# Patient Record
Sex: FEMALE | Race: BLACK OR AFRICAN AMERICAN | NOT HISPANIC OR LATINO
[De-identification: names, ages, dates, MRNs, and addresses within clinical notes are randomized per-mention and may not be internally consistent; named-entity substitution may affect disease eponyms.]

---

## 2020-01-04 ENCOUNTER — TRANSCRIPTION ENCOUNTER (OUTPATIENT)
Age: 45
End: 2020-01-04

## 2020-10-27 ENCOUNTER — TRANSCRIPTION ENCOUNTER (OUTPATIENT)
Age: 45
End: 2020-10-27

## 2020-10-28 ENCOUNTER — OUTPATIENT (OUTPATIENT)
Dept: OUTPATIENT SERVICES | Facility: HOSPITAL | Age: 45
LOS: 1 days | End: 2020-10-28

## 2020-10-28 VITALS
OXYGEN SATURATION: 99 % | HEART RATE: 89 BPM | RESPIRATION RATE: 16 BRPM | HEIGHT: 70 IN | SYSTOLIC BLOOD PRESSURE: 110 MMHG | WEIGHT: 190.04 LBS | DIASTOLIC BLOOD PRESSURE: 78 MMHG | TEMPERATURE: 97 F

## 2020-10-28 DIAGNOSIS — L90.5 SCAR CONDITIONS AND FIBROSIS OF SKIN: ICD-10-CM

## 2020-10-28 DIAGNOSIS — Z98.890 OTHER SPECIFIED POSTPROCEDURAL STATES: Chronic | ICD-10-CM

## 2020-10-28 DIAGNOSIS — Z64.1 PROBLEMS RELATED TO MULTIPARITY: ICD-10-CM

## 2020-10-28 LAB
APPEARANCE UR: CLEAR — SIGNIFICANT CHANGE UP
APTT BLD: 25.7 SEC — LOW (ref 27–36.3)
BILIRUB UR-MCNC: NEGATIVE — SIGNIFICANT CHANGE UP
BLD GP AB SCN SERPL QL: NEGATIVE — SIGNIFICANT CHANGE UP
BLOOD UR QL VISUAL: NEGATIVE — SIGNIFICANT CHANGE UP
COLOR SPEC: SIGNIFICANT CHANGE UP
GLUCOSE UR-MCNC: NEGATIVE — SIGNIFICANT CHANGE UP
HCT VFR BLD CALC: 35.4 % — SIGNIFICANT CHANGE UP (ref 34.5–45)
HGB BLD-MCNC: 10.9 G/DL — LOW (ref 11.5–15.5)
INR BLD: 1.01 — SIGNIFICANT CHANGE UP (ref 0.88–1.16)
KETONES UR-MCNC: NEGATIVE — SIGNIFICANT CHANGE UP
LEUKOCYTE ESTERASE UR-ACNC: NEGATIVE — SIGNIFICANT CHANGE UP
MCHC RBC-ENTMCNC: 26.9 PG — LOW (ref 27–34)
MCHC RBC-ENTMCNC: 30.8 % — LOW (ref 32–36)
MCV RBC AUTO: 87.4 FL — SIGNIFICANT CHANGE UP (ref 80–100)
NITRITE UR-MCNC: NEGATIVE — SIGNIFICANT CHANGE UP
NRBC # FLD: 0 K/UL — SIGNIFICANT CHANGE UP (ref 0–0)
PH UR: 6.5 — SIGNIFICANT CHANGE UP (ref 5–8)
PLATELET # BLD AUTO: 189 K/UL — SIGNIFICANT CHANGE UP (ref 150–400)
PMV BLD: 13.7 FL — HIGH (ref 7–13)
PROT UR-MCNC: 10 — SIGNIFICANT CHANGE UP
PROTHROM AB SERPL-ACNC: 11.6 SEC — SIGNIFICANT CHANGE UP (ref 10.6–13.6)
RBC # BLD: 4.05 M/UL — SIGNIFICANT CHANGE UP (ref 3.8–5.2)
RBC # FLD: 13.9 % — SIGNIFICANT CHANGE UP (ref 10.3–14.5)
RH IG SCN BLD-IMP: POSITIVE — SIGNIFICANT CHANGE UP
SP GR SPEC: 1.01 — SIGNIFICANT CHANGE UP (ref 1–1.04)
UROBILINOGEN FLD QL: NORMAL — SIGNIFICANT CHANGE UP
WBC # BLD: 7.77 K/UL — SIGNIFICANT CHANGE UP (ref 3.8–10.5)
WBC # FLD AUTO: 7.77 K/UL — SIGNIFICANT CHANGE UP (ref 3.8–10.5)

## 2020-10-28 NOTE — OB PST NOTE - NSHPREVIEWOFSYSTEMS_GEN_ALL_CORE
General: No fever, chills, sweating, anorexia, weight loss or weight gain. No polyphagia, polyurea, polydypsia, malaise, or fatigue    Skin: No rashes, itching, or dryness. No change in size/color of moles. No tumors, brittle nails, pitted nails, or hair loss    Breast: No tenderness, lumps, or nipple discharge      Ophthalmologic: No diplopia, photophobia, lacrimation, blurred Vision , or eye discharge    ENMT Symptoms: No hearing difficulty, ear pain, tinnitus, or vertigo. No sinus symptoms, nasal congestion, nasal   discharge, or nasal obstruction    Respiratory and Thorax: No wheezing, dyspnea, cough, hemoptysis, or pleuritic chest pPain     Cardiovascular: No chest pain, palpitations, dyspnea on exertion, orthopnea, paroxysmal nocturnal dyspnea,   peripheral edema, or claudication    Gastrointestinal: No nausea, vomiting, diarrhea, constipation, change in bowel habits, flatulence, abdominal pain, or melena    Genitourinary/ Pelvis: No hematuria, renal colic, or flank pain.  No urine discoloration, incontinence, dysuria, or urinary hesitancy. Normal urinary frequency. No nocturia, abnormal vaginal bleeding, vaginal discharge, spotting, pelvic pain, or vaginal leakage    Musculoskeletal: No arthralgia, arthritis, joint swelling, muscle cramping, muscle weakness, neck pain, arm pain, or leg pain    Neurological: No transient paralysis, weakness, paresthesias, or seizures. No syncope, tremors, vertigo, loss of sensation, difficulty walking, loss of consciousness, hemiparesis, confusion, or facial palsy    Psychiatric: No suicidal ideation, depression, anxiety, insomnia, memory loss, paranoia, mood swings, agitation, hallucinations, or hyperactivity    Hematology: epistaxis 3-4 times/month since 6/2020.  Pt. showed picture of first nose bleed and it was excessive bleeding, pt. had a nose bleed yesterday, 10/27/20,  Sent PT/PTT    Lymphatic: No enlarged or tender lymph nodes. No extremity swelling    Endocrine: No heat or cold intolerance    Immunologic: No recurrent or persistent infections

## 2020-10-28 NOTE — OB PST NOTE - PROBLEM SELECTOR PLAN 1
Pt. is scheduled for a primary ...NITO tubal...10/30/20.  Pt. verbalized understanding of instructions and that Chlorhexidine is for external use.  Pt. had COVID test.

## 2020-10-28 NOTE — OB PST NOTE - FAMILY HISTORY
Mother  Still living? Yes, Estimated age: Age Unknown  Hypertension, Age at diagnosis: Age Unknown     Grandparent  Still living? No  Hypertension, Age at diagnosis: Age Unknown

## 2020-10-29 ENCOUNTER — TRANSCRIPTION ENCOUNTER (OUTPATIENT)
Age: 45
End: 2020-10-29

## 2020-10-30 ENCOUNTER — INPATIENT (INPATIENT)
Facility: HOSPITAL | Age: 45
LOS: 1 days | Discharge: HOME | End: 2020-11-01
Attending: OBSTETRICS & GYNECOLOGY | Admitting: OBSTETRICS & GYNECOLOGY
Payer: MEDICAID

## 2020-10-30 VITALS — HEART RATE: 81 BPM | SYSTOLIC BLOOD PRESSURE: 126 MMHG | DIASTOLIC BLOOD PRESSURE: 89 MMHG

## 2020-10-30 DIAGNOSIS — Z98.890 OTHER SPECIFIED POSTPROCEDURAL STATES: Chronic | ICD-10-CM

## 2020-10-30 DIAGNOSIS — Z64.1 PROBLEMS RELATED TO MULTIPARITY: ICD-10-CM

## 2020-10-30 LAB
BASOPHILS # BLD AUTO: 0.02 K/UL — SIGNIFICANT CHANGE UP (ref 0–0.2)
BASOPHILS NFR BLD AUTO: 0.2 % — SIGNIFICANT CHANGE UP (ref 0–2)
EOSINOPHIL # BLD AUTO: 0.03 K/UL — SIGNIFICANT CHANGE UP (ref 0–0.5)
EOSINOPHIL NFR BLD AUTO: 0.4 % — SIGNIFICANT CHANGE UP (ref 0–6)
HCT VFR BLD CALC: 35.3 % — SIGNIFICANT CHANGE UP (ref 34.5–45)
HGB BLD-MCNC: 10.8 G/DL — LOW (ref 11.5–15.5)
IMM GRANULOCYTES NFR BLD AUTO: 0.4 % — SIGNIFICANT CHANGE UP (ref 0–1.5)
LYMPHOCYTES # BLD AUTO: 1.85 K/UL — SIGNIFICANT CHANGE UP (ref 1–3.3)
LYMPHOCYTES # BLD AUTO: 22.5 % — SIGNIFICANT CHANGE UP (ref 13–44)
MCHC RBC-ENTMCNC: 26.5 PG — LOW (ref 27–34)
MCHC RBC-ENTMCNC: 30.6 % — LOW (ref 32–36)
MCV RBC AUTO: 86.7 FL — SIGNIFICANT CHANGE UP (ref 80–100)
MONOCYTES # BLD AUTO: 1.08 K/UL — HIGH (ref 0–0.9)
MONOCYTES NFR BLD AUTO: 13.1 % — SIGNIFICANT CHANGE UP (ref 2–14)
NEUTROPHILS # BLD AUTO: 5.23 K/UL — SIGNIFICANT CHANGE UP (ref 1.8–7.4)
NEUTROPHILS NFR BLD AUTO: 63.4 % — SIGNIFICANT CHANGE UP (ref 43–77)
NRBC # FLD: 0 K/UL — SIGNIFICANT CHANGE UP (ref 0–0)
PLATELET # BLD AUTO: 185 K/UL — SIGNIFICANT CHANGE UP (ref 150–400)
PMV BLD: 12.6 FL — SIGNIFICANT CHANGE UP (ref 7–13)
RBC # BLD: 4.07 M/UL — SIGNIFICANT CHANGE UP (ref 3.8–5.2)
RBC # FLD: 13.8 % — SIGNIFICANT CHANGE UP (ref 10.3–14.5)
RH IG SCN BLD-IMP: POSITIVE — SIGNIFICANT CHANGE UP
SARS-COV-2 IGG SERPL QL IA: NEGATIVE — SIGNIFICANT CHANGE UP
SARS-COV-2 IGM SERPL IA-ACNC: <0.1 INDEX — SIGNIFICANT CHANGE UP
T PALLIDUM AB TITR SER: NEGATIVE — SIGNIFICANT CHANGE UP
WBC # BLD: 8.24 K/UL — SIGNIFICANT CHANGE UP (ref 3.8–10.5)
WBC # FLD AUTO: 8.24 K/UL — SIGNIFICANT CHANGE UP (ref 3.8–10.5)

## 2020-10-30 PROCEDURE — 59514 CESAREAN DELIVERY ONLY: CPT | Mod: AS,U8

## 2020-10-30 PROCEDURE — 59025 FETAL NON-STRESS TEST: CPT | Mod: 26

## 2020-10-30 PROCEDURE — 88302 TISSUE EXAM BY PATHOLOGIST: CPT | Mod: 26

## 2020-10-30 RX ORDER — SODIUM CHLORIDE 9 MG/ML
500 INJECTION, SOLUTION INTRAVENOUS ONCE
Refills: 0 | Status: COMPLETED | OUTPATIENT
Start: 2020-10-30 | End: 2020-10-30

## 2020-10-30 RX ORDER — LANOLIN
1 OINTMENT (GRAM) TOPICAL EVERY 6 HOURS
Refills: 0 | Status: DISCONTINUED | OUTPATIENT
Start: 2020-10-30 | End: 2020-11-01

## 2020-10-30 RX ORDER — OXYCODONE HYDROCHLORIDE 5 MG/1
5 TABLET ORAL
Refills: 0 | Status: COMPLETED | OUTPATIENT
Start: 2020-10-30 | End: 2020-11-06

## 2020-10-30 RX ORDER — MAGNESIUM HYDROXIDE 400 MG/1
30 TABLET, CHEWABLE ORAL
Refills: 0 | Status: DISCONTINUED | OUTPATIENT
Start: 2020-10-30 | End: 2020-11-01

## 2020-10-30 RX ORDER — INFLUENZA VIRUS VACCINE 15; 15; 15; 15 UG/.5ML; UG/.5ML; UG/.5ML; UG/.5ML
0.5 SUSPENSION INTRAMUSCULAR ONCE
Refills: 0 | Status: COMPLETED | OUTPATIENT
Start: 2020-10-30 | End: 2020-11-01

## 2020-10-30 RX ORDER — SODIUM CHLORIDE 9 MG/ML
1000 INJECTION, SOLUTION INTRAVENOUS ONCE
Refills: 0 | Status: COMPLETED | OUTPATIENT
Start: 2020-10-30 | End: 2020-10-30

## 2020-10-30 RX ORDER — SODIUM CHLORIDE 9 MG/ML
1000 INJECTION, SOLUTION INTRAVENOUS
Refills: 0 | Status: DISCONTINUED | OUTPATIENT
Start: 2020-10-30 | End: 2020-11-01

## 2020-10-30 RX ORDER — OXYTOCIN 10 UNIT/ML
333.3 VIAL (ML) INJECTION
Qty: 20 | Refills: 0 | Status: DISCONTINUED | OUTPATIENT
Start: 2020-10-30 | End: 2020-10-30

## 2020-10-30 RX ORDER — HYDROMORPHONE HYDROCHLORIDE 2 MG/ML
1 INJECTION INTRAMUSCULAR; INTRAVENOUS; SUBCUTANEOUS
Refills: 0 | Status: DISCONTINUED | OUTPATIENT
Start: 2020-10-30 | End: 2020-10-30

## 2020-10-30 RX ORDER — OXYTOCIN 10 UNIT/ML
41.67 VIAL (ML) INJECTION
Qty: 20 | Refills: 0 | Status: DISCONTINUED | OUTPATIENT
Start: 2020-10-30 | End: 2020-10-31

## 2020-10-30 RX ORDER — HEPARIN SODIUM 5000 [USP'U]/ML
5000 INJECTION INTRAVENOUS; SUBCUTANEOUS EVERY 12 HOURS
Refills: 0 | Status: DISCONTINUED | OUTPATIENT
Start: 2020-10-30 | End: 2020-11-01

## 2020-10-30 RX ORDER — DIPHENHYDRAMINE HCL 50 MG
25 CAPSULE ORAL EVERY 6 HOURS
Refills: 0 | Status: DISCONTINUED | OUTPATIENT
Start: 2020-10-30 | End: 2020-11-01

## 2020-10-30 RX ORDER — OXYCODONE HYDROCHLORIDE 5 MG/1
5 TABLET ORAL ONCE
Refills: 0 | Status: DISCONTINUED | OUTPATIENT
Start: 2020-10-30 | End: 2020-11-01

## 2020-10-30 RX ORDER — IBUPROFEN 200 MG
600 TABLET ORAL EVERY 6 HOURS
Refills: 0 | Status: COMPLETED | OUTPATIENT
Start: 2020-10-30 | End: 2021-09-28

## 2020-10-30 RX ORDER — SODIUM CHLORIDE 9 MG/ML
1000 INJECTION, SOLUTION INTRAVENOUS
Refills: 0 | Status: DISCONTINUED | OUTPATIENT
Start: 2020-10-30 | End: 2020-10-30

## 2020-10-30 RX ORDER — FAMOTIDINE 10 MG/ML
20 INJECTION INTRAVENOUS ONCE
Refills: 0 | Status: COMPLETED | OUTPATIENT
Start: 2020-10-30 | End: 2020-10-30

## 2020-10-30 RX ORDER — OXYTOCIN 10 UNIT/ML
130 VIAL (ML) INJECTION
Qty: 20 | Refills: 0 | Status: DISCONTINUED | OUTPATIENT
Start: 2020-10-30 | End: 2020-10-30

## 2020-10-30 RX ORDER — METOCLOPRAMIDE HCL 10 MG
10 TABLET ORAL ONCE
Refills: 0 | Status: COMPLETED | OUTPATIENT
Start: 2020-10-30 | End: 2020-10-30

## 2020-10-30 RX ORDER — SIMETHICONE 80 MG/1
80 TABLET, CHEWABLE ORAL EVERY 4 HOURS
Refills: 0 | Status: DISCONTINUED | OUTPATIENT
Start: 2020-10-30 | End: 2020-11-01

## 2020-10-30 RX ORDER — ACETAMINOPHEN 500 MG
975 TABLET ORAL
Refills: 0 | Status: DISCONTINUED | OUTPATIENT
Start: 2020-10-30 | End: 2020-11-01

## 2020-10-30 RX ORDER — CITRIC ACID/SODIUM CITRATE 300-500 MG
30 SOLUTION, ORAL ORAL ONCE
Refills: 0 | Status: COMPLETED | OUTPATIENT
Start: 2020-10-30 | End: 2020-10-30

## 2020-10-30 RX ORDER — KETOROLAC TROMETHAMINE 30 MG/ML
30 SYRINGE (ML) INJECTION EVERY 6 HOURS
Refills: 0 | Status: DISCONTINUED | OUTPATIENT
Start: 2020-10-30 | End: 2020-10-31

## 2020-10-30 RX ORDER — TETANUS TOXOID, REDUCED DIPHTHERIA TOXOID AND ACELLULAR PERTUSSIS VACCINE, ADSORBED 5; 2.5; 8; 8; 2.5 [IU]/.5ML; [IU]/.5ML; UG/.5ML; UG/.5ML; UG/.5ML
0.5 SUSPENSION INTRAMUSCULAR ONCE
Refills: 0 | Status: COMPLETED | OUTPATIENT
Start: 2020-10-30

## 2020-10-30 RX ADMIN — HEPARIN SODIUM 5000 UNIT(S): 5000 INJECTION INTRAVENOUS; SUBCUTANEOUS at 17:48

## 2020-10-30 RX ADMIN — Medication 30 MILLILITER(S): at 07:13

## 2020-10-30 RX ADMIN — Medication 125 MILLIUNIT(S)/MIN: at 11:31

## 2020-10-30 RX ADMIN — SODIUM CHLORIDE 75 MILLILITER(S): 9 INJECTION, SOLUTION INTRAVENOUS at 11:30

## 2020-10-30 RX ADMIN — SODIUM CHLORIDE 1000 MILLILITER(S): 9 INJECTION, SOLUTION INTRAVENOUS at 13:13

## 2020-10-30 RX ADMIN — Medication 10 MILLIGRAM(S): at 07:13

## 2020-10-30 RX ADMIN — Medication 975 MILLIGRAM(S): at 17:47

## 2020-10-30 RX ADMIN — SODIUM CHLORIDE 2000 MILLILITER(S): 9 INJECTION, SOLUTION INTRAVENOUS at 07:14

## 2020-10-30 RX ADMIN — FAMOTIDINE 20 MILLIGRAM(S): 10 INJECTION INTRAVENOUS at 07:13

## 2020-10-30 RX ADMIN — SODIUM CHLORIDE 125 MILLILITER(S): 9 INJECTION, SOLUTION INTRAVENOUS at 07:14

## 2020-10-30 RX ADMIN — Medication 975 MILLIGRAM(S): at 18:20

## 2020-10-30 NOTE — OB RN DELIVERY SUMMARY - NS_SEPSISRSKCALC_OBGYN_ALL_OB_FT
EOS calculated successfully. EOS Risk Factor: 0.5/1000 live births ([facility] national incidence); GA=37w3d; Temp=98.6; ROM=0.017; GBS='Positive'; Antibiotics='No antibiotics or any antibiotics < 2 hrs prior to birth'

## 2020-10-30 NOTE — OB PROVIDER H&P - ASSESSMENT
45y , EGA@37.3 weeks, presented for scheduled primary  section with BTL due to the history of myomectomy.  Prenatal care with Dr. Kuhn  Prenatal course is significant for  AMA.  GBS status is positive.  No adverse reactions to anesthesia, no objections to blood transfusions if clinically indicated.  OB hx: , , 8 lbs, uncomplicated  Med hx: denies  Surg hx: , myomectomy, transfused 1 PRBC, 2 D&Cs; , inguinal hernia repair   GYN hx: denies  Meds: Prenatal vitamins  Allergies: citrus fruits  Social hx: Denies alcohol, tobacco, drug use    PHYSICAL EXAM  Vital Signs HR: 81 (30 Oct 2020 06:57); BP: 126/89 (30 Oct 2020 06:57);   Gen: NAD  Head: NC/AT  Cardio: S1S2+, RRR  Resp: CTABL, no wheezing  Abdomen: Soft, NT/ND, BS+  Extremities: No LE edema bilaterally    FHR: 135 HR baseline, moderate variability, accelerations present, no decelerations, Category 1.  [place]: mild occasional contractions   SVE: deferred    A:  45y , EGA@37.4 weeks, scheduled for primary  section with BTL; history of myomectomy. +GBS status    Plan: prepare for  section with BTL; typed and screen for 2 Units.

## 2020-10-30 NOTE — OB RN DELIVERY SUMMARY - NS_CORDBLDGASA_OBGYN_ALL_OB
Both arterial and venous
Hepatitis C  Resolved after Harvoni treatment  HTN (hypertension)    Rheumatoid arthritis

## 2020-10-30 NOTE — OB PROVIDER DELIVERY SUMMARY - AMNIOTIC FLUID AMOUNT, LABOR
[Normal] : normal gait, coordination grossly intact, no focal deficits and deep tendon reflexes were 2+ and symmetric [de-identified] : Unable to assess b/l retinas on fundoscopy d/t b/l cataract within normal limits

## 2020-10-30 NOTE — BRIEF OPERATIVE NOTE - OPERATION/FINDINGS
Viable male infant, apgar 8/8, weight 2850 g,  delivered @8:50am  cephalic presentation, clear copious fluid;   loose nuchal x 1, hysterotomy closed in 2 layers; Fibrillar placed over hysterotomy, rectus layer, fascia layer, otherwise grossly nl uterus, tubes & ovaries. Bilateral tubal ligation was performed using LigaSure; both tubes sent to the pathology.  QBL: 339 cc  EBL: 800 cc  UOP: 250  IVF: 2800  Dictation done by Dr. Kuhn.  Patient is stable to L&D PACU, bonding with parents.    Heidy Mccray CNM

## 2020-10-30 NOTE — OB NEONATOLOGY/PEDIATRICIAN DELIVERY SUMMARY - NSPEDSNEONOTESA_OBGYN_ALL_OB_FT
37+3 wk male born via scheduled CS to a 46 y/o  blood type O+ mother. Maternal history of myomectomy, anemia, and epistaxis. No significant prenatal history. PNL -/-/NR/I, GBS + on 10/19. AROM at 0850 with clear fluids. Baby emerged vigorous, crying, was w/d/s/s with APGARS of 8/8. Required CPAP starting at 5 minutes of life, received approximately 15 minutes of CPAP. Mom plans to initiate formula feed, consents Hep B vaccine and consents circ. EOS 0.1.

## 2020-10-30 NOTE — BRIEF OPERATIVE NOTE - NSICDXBRIEFPOSTOP_GEN_ALL_CORE_FT
POST-OP DIAGNOSIS:  Single liveborn infant, delivered by  30-Oct-2020 10:17:48  Heidy Mccray  Delivery of pregnancy by  section 30-Oct-2020 10:17:27  Heidy Mccray

## 2020-10-30 NOTE — OB PROVIDER DELIVERY SUMMARY - NSPROVIDERDELIVERYNOTE_OBGYN_ALL_OB_FT
Viable male infant, apgar 8/8, weight 2850 g,  delivered @8:50am  cephalic presentation, clear copious fluid; clear fluid noted  loose nuchal x 1, hysterotomy closed in 2 layers; Fibrillar placed over hysterotomy, rectus layer, fascia layer, otherwise grossly nl uterus, tubes & ovaries. Bilateral tubal ligation was performed using LigaSure; both tubes sent to the pathology.  QBL: 339 cc  EBL: 800 cc  250  IVF: 2800  Dictation done by Dr. Kuhn.  Patient is stable to L&D PACU, bonding wi

## 2020-10-30 NOTE — BRIEF OPERATIVE NOTE - NSICDXBRIEFPREOP_GEN_ALL_CORE_FT
PRE-OP DIAGNOSIS:  History of myomectomy 30-Oct-2020 10:16:44  Heidy Mccray  H/O myomectomy 30-Oct-2020 10:16:27  Heidy Mccray

## 2020-10-30 NOTE — OB RN DELIVERY SUMMARY - BABY A: APGAR 5 MIN SCORE, DELIVERY
Pt states no changes to meds or diet.  No bleeding issues. Continue current coumadin regimen.  Pt is on a twice weekly INR per Dr. Welch request; f/u appt made for this Friday.  Pt verbalizes. Patient instructed regarding medication; results given and questions answered. Nutritional counseling given.  Dietary factors affecting therapy addressed.  Patient instructed to monitor for excessive bruising or bleeding.        This document has been electronically signed by JR Konx on September 26, 2017 4:40 PM       8

## 2020-10-31 LAB
BASOPHILS # BLD AUTO: 0.02 K/UL — SIGNIFICANT CHANGE UP (ref 0–0.2)
BASOPHILS NFR BLD AUTO: 0.2 % — SIGNIFICANT CHANGE UP (ref 0–2)
EOSINOPHIL # BLD AUTO: 0.15 K/UL — SIGNIFICANT CHANGE UP (ref 0–0.5)
EOSINOPHIL NFR BLD AUTO: 1.4 % — SIGNIFICANT CHANGE UP (ref 0–6)
HCT VFR BLD CALC: 29.9 % — LOW (ref 34.5–45)
HGB BLD-MCNC: 9.2 G/DL — LOW (ref 11.5–15.5)
IMM GRANULOCYTES NFR BLD AUTO: 0.4 % — SIGNIFICANT CHANGE UP (ref 0–1.5)
LYMPHOCYTES # BLD AUTO: 1.54 K/UL — SIGNIFICANT CHANGE UP (ref 1–3.3)
LYMPHOCYTES # BLD AUTO: 14.7 % — SIGNIFICANT CHANGE UP (ref 13–44)
MCHC RBC-ENTMCNC: 27.2 PG — SIGNIFICANT CHANGE UP (ref 27–34)
MCHC RBC-ENTMCNC: 30.8 % — LOW (ref 32–36)
MCV RBC AUTO: 88.5 FL — SIGNIFICANT CHANGE UP (ref 80–100)
MONOCYTES # BLD AUTO: 1.23 K/UL — HIGH (ref 0–0.9)
MONOCYTES NFR BLD AUTO: 11.7 % — SIGNIFICANT CHANGE UP (ref 2–14)
NEUTROPHILS # BLD AUTO: 7.51 K/UL — HIGH (ref 1.8–7.4)
NEUTROPHILS NFR BLD AUTO: 71.6 % — SIGNIFICANT CHANGE UP (ref 43–77)
NRBC # FLD: 0 K/UL — SIGNIFICANT CHANGE UP (ref 0–0)
PLATELET # BLD AUTO: 166 K/UL — SIGNIFICANT CHANGE UP (ref 150–400)
PMV BLD: 13.6 FL — HIGH (ref 7–13)
RBC # BLD: 3.38 M/UL — LOW (ref 3.8–5.2)
RBC # FLD: 13.9 % — SIGNIFICANT CHANGE UP (ref 10.3–14.5)
WBC # BLD: 10.49 K/UL — SIGNIFICANT CHANGE UP (ref 3.8–10.5)
WBC # FLD AUTO: 10.49 K/UL — SIGNIFICANT CHANGE UP (ref 3.8–10.5)

## 2020-10-31 RX ORDER — OXYCODONE HYDROCHLORIDE 5 MG/1
5 TABLET ORAL ONCE
Refills: 0 | Status: DISCONTINUED | OUTPATIENT
Start: 2020-10-31 | End: 2020-11-01

## 2020-10-31 RX ORDER — IBUPROFEN 200 MG
600 TABLET ORAL EVERY 6 HOURS
Refills: 0 | Status: DISCONTINUED | OUTPATIENT
Start: 2020-10-31 | End: 2020-11-01

## 2020-10-31 RX ORDER — OXYCODONE HYDROCHLORIDE 5 MG/1
5 TABLET ORAL
Refills: 0 | Status: DISCONTINUED | OUTPATIENT
Start: 2020-10-31 | End: 2020-11-01

## 2020-10-31 RX ADMIN — Medication 600 MILLIGRAM(S): at 12:53

## 2020-10-31 RX ADMIN — Medication 30 MILLIGRAM(S): at 05:41

## 2020-10-31 RX ADMIN — Medication 975 MILLIGRAM(S): at 00:11

## 2020-10-31 RX ADMIN — Medication 975 MILLIGRAM(S): at 01:00

## 2020-10-31 RX ADMIN — Medication 600 MILLIGRAM(S): at 19:00

## 2020-10-31 RX ADMIN — Medication 600 MILLIGRAM(S): at 13:30

## 2020-10-31 RX ADMIN — Medication 30 MILLIGRAM(S): at 06:50

## 2020-10-31 RX ADMIN — HEPARIN SODIUM 5000 UNIT(S): 5000 INJECTION INTRAVENOUS; SUBCUTANEOUS at 18:10

## 2020-10-31 RX ADMIN — HEPARIN SODIUM 5000 UNIT(S): 5000 INJECTION INTRAVENOUS; SUBCUTANEOUS at 05:40

## 2020-10-31 RX ADMIN — Medication 600 MILLIGRAM(S): at 18:10

## 2020-10-31 NOTE — PROGRESS NOTE ADULT - SUBJECTIVE AND OBJECTIVE BOX
OB Progress Note:  Delivery, POD#1    S: 44yo POD#1 s/p LTCS . Her pain is well controlled. She is tolerating a regular diet and ambulating. Has yet to void or pass flatus. No headache, RUQ pain, or vision changes. Denies chest pain, SOB, dizziness, lightheadedness, n/v, fever/chills, or any other concerns. No heavy vaginal bleeding.     O:   Vital Signs Last 24 Hrs  T(C): 36.4 (31 Oct 2020 01:45), Max: 37 (30 Oct 2020 07:15)  T(F): 97.6 (31 Oct 2020 01:45), Max: 98.6 (30 Oct 2020 07:15)  HR: 71 (31 Oct 2020 01:45) (63 - 85)  BP: 111/73 (31 Oct 2020 01:45) (74/49 - 126/89)  BP(mean): 87 (30 Oct 2020 13:30) (54 - 87)  RR: 17 (31 Oct 2020 01:45) (10 - 21)  SpO2: 100% (31 Oct 2020 01:45) (98% - 100%)    PE:  General: NAD  Abdomen: soft, discomfort with palpation, bandage removed, incision c/d/i with steri strips  Extremities: No erythema, no pitting edema    Labs:  Blood type: O Positive  Rubella IgG: RPR: Negative                          10.8<L>   8.24 >-----------< 185    ( 10-30 @ 06:39 )             35.3                        10.9<L>   7.77 >-----------< 189    ( 10-28 @ 18:00 )             35.4                  A/P: 44yo POD#1 s/p LTCS.  Patient is stable and doing well post-operatively.    - Continue regular diet.  - Increase ambulation.  - Continue motrin, tylenol, oxycodone PRN for pain control  - F/u AM CBC    Niki Hammer, PGY1

## 2020-10-31 NOTE — PROGRESS NOTE ADULT - SUBJECTIVE AND OBJECTIVE BOX
Postpartum Note,  Section  She is a  45y woman who is now post-operative day: 1    Subjective:  The patient feels well.  She is ambulating.   She is tolerating regular diet.  She denies nausea and vomiting.  She is voiding.  Her pain is controlled.  She reports normal postpartum bleeding.    Vital Signs Last 24 Hrs  T(C): 36.4 (31 Oct 2020 05:35), Max: 36.8 (30 Oct 2020 16:20)  T(F): 97.5 (31 Oct 2020 05:35), Max: 98.3 (30 Oct 2020 16:20)  HR: 70 (31 Oct 2020 05:35) (63 - 85)  BP: 124/80 (31 Oct 2020 05:35) (74/49 - 124/80)  BP(mean): 87 (30 Oct 2020 13:30) (54 - 87)  RR: 17 (31 Oct 2020 05:35) (10 - 21)  SpO2: 100% (31 Oct 2020 05:35) (98% - 100%)    Physical exam:  Gen: NAD  Breast: Soft, nontender, not engorged.  Abdomen: Soft, nontender, no distension , firm uterine fundus at umbilicus.  Incision: Clean, dry, and intact with steri strips  Pelvic: Normal lochia noted  Ext: No calf tenderness    LABS:                        10.8   8.24  )-----------( 185      ( 30 Oct 2020 06:39 )             35.3         Allergies    citrus (Unknown)  No Known Drug Allergies    Intolerances      MEDICATIONS  (STANDING):  acetaminophen   Tablet .. 975 milliGRAM(s) Oral <User Schedule>  diphtheria/tetanus/pertussis (acellular) Vaccine (ADAcel) 0.5 milliLiter(s) IntraMuscular once  heparin   Injectable 5000 Unit(s) SubCutaneous every 12 hours  ibuprofen  Tablet. 600 milliGRAM(s) Oral every 6 hours  influenza   Vaccine 0.5 milliLiter(s) IntraMuscular once  ketorolac   Injectable 30 milliGRAM(s) IV Push every 6 hours  lactated ringers. 1000 milliLiter(s) (125 mL/Hr) IV Continuous <Continuous>    MEDICATIONS  (PRN):  diphenhydrAMINE 25 milliGRAM(s) Oral every 6 hours PRN Itching  lanolin Ointment 1 Application(s) Topical every 6 hours PRN Sore Nipples  magnesium hydroxide Suspension 30 milliLiter(s) Oral two times a day PRN Constipation  oxyCODONE    IR 5 milliGRAM(s) Oral once PRN Moderate to Severe Pain (4-10)  oxyCODONE    IR 5 milliGRAM(s) Oral once PRN Moderate to Severe Pain (4-10)  oxyCODONE    IR 5 milliGRAM(s) Oral every 3 hours PRN Moderate to Severe Pain (4-10)  simethicone 80 milliGRAM(s) Chew every 4 hours PRN Gas        Assessment and Plan  POD # 1 s/p  section  Doing well.  Encourage ambulation.

## 2020-11-01 ENCOUNTER — TRANSCRIPTION ENCOUNTER (OUTPATIENT)
Age: 45
End: 2020-11-01

## 2020-11-01 VITALS
DIASTOLIC BLOOD PRESSURE: 74 MMHG | HEART RATE: 88 BPM | TEMPERATURE: 98 F | SYSTOLIC BLOOD PRESSURE: 116 MMHG | RESPIRATION RATE: 18 BRPM | OXYGEN SATURATION: 100 %

## 2020-11-01 RX ORDER — TETANUS TOXOID, REDUCED DIPHTHERIA TOXOID AND ACELLULAR PERTUSSIS VACCINE, ADSORBED 5; 2.5; 8; 8; 2.5 [IU]/.5ML; [IU]/.5ML; UG/.5ML; UG/.5ML; UG/.5ML
0.5 SUSPENSION INTRAMUSCULAR ONCE
Refills: 0 | Status: COMPLETED | OUTPATIENT
Start: 2020-11-01 | End: 2020-11-01

## 2020-11-01 RX ORDER — ACETAMINOPHEN 500 MG
3 TABLET ORAL
Qty: 0 | Refills: 0 | DISCHARGE
Start: 2020-11-01

## 2020-11-01 RX ORDER — IBUPROFEN 200 MG
1 TABLET ORAL
Qty: 0 | Refills: 0 | DISCHARGE
Start: 2020-11-01

## 2020-11-01 RX ADMIN — HEPARIN SODIUM 5000 UNIT(S): 5000 INJECTION INTRAVENOUS; SUBCUTANEOUS at 05:33

## 2020-11-01 RX ADMIN — Medication 600 MILLIGRAM(S): at 06:00

## 2020-11-01 RX ADMIN — Medication 600 MILLIGRAM(S): at 00:08

## 2020-11-01 RX ADMIN — Medication 600 MILLIGRAM(S): at 11:14

## 2020-11-01 RX ADMIN — Medication 975 MILLIGRAM(S): at 09:48

## 2020-11-01 RX ADMIN — TETANUS TOXOID, REDUCED DIPHTHERIA TOXOID AND ACELLULAR PERTUSSIS VACCINE, ADSORBED 0.5 MILLILITER(S): 5; 2.5; 8; 8; 2.5 SUSPENSION INTRAMUSCULAR at 05:34

## 2020-11-01 RX ADMIN — Medication 600 MILLIGRAM(S): at 00:38

## 2020-11-01 RX ADMIN — Medication 975 MILLIGRAM(S): at 09:09

## 2020-11-01 RX ADMIN — INFLUENZA VIRUS VACCINE 0.5 MILLILITER(S): 15; 15; 15; 15 SUSPENSION INTRAMUSCULAR at 05:35

## 2020-11-01 RX ADMIN — Medication 600 MILLIGRAM(S): at 05:33

## 2020-11-01 NOTE — PROGRESS NOTE ADULT - SUBJECTIVE AND OBJECTIVE BOX
Postpartum Note,  Section  She is a  45y woman who is now post-operative day: 2    Subjective:  The patient feels well.  She is ambulating.   She is tolerating regular diet.  She denies nausea and vomiting.  She is voiding.  Her pain is controlled.  She reports normal postpartum bleeding.  She is breastfeeding.  She is formula feeding.    Physical exam:    Vital Signs Last 24 Hrs  T(C): 36.8 (2020 05:30), Max: 37 (31 Oct 2020 13:35)  T(F): 98.3 (2020 05:30), Max: 98.6 (31 Oct 2020 13:35)  HR: 85 (2020 05:30) (77 - 98)  BP: 121/77 (2020 05:30) (109/63 - 131/78)  BP(mean): --  RR: 18 (2020 05:30) (18 - 18)  SpO2: 100% (2020 05:30) (100% - 100%)    Gen: NAD  Breast: Soft, nontender, not engorged.  Abdomen: Soft, nontender, no distension , firm uterine fundus at umbilicus.  Incision: Clean, dry, and intact with steri strips  Pelvic: Normal lochia noted  Ext: No calf tenderness    LABS:                        9.2    10.49 )-----------( 166      ( 31 Oct 2020 06:30 )             29.9       Rubella status:     Allergies    citrus (Unknown)  No Known Drug Allergies    Intolerances      MEDICATIONS  (STANDING):  acetaminophen   Tablet .. 975 milliGRAM(s) Oral <User Schedule>  heparin   Injectable 5000 Unit(s) SubCutaneous every 12 hours  ibuprofen  Tablet. 600 milliGRAM(s) Oral every 6 hours  lactated ringers. 1000 milliLiter(s) (125 mL/Hr) IV Continuous <Continuous>    MEDICATIONS  (PRN):  diphenhydrAMINE 25 milliGRAM(s) Oral every 6 hours PRN Itching  lanolin Ointment 1 Application(s) Topical every 6 hours PRN Sore Nipples  magnesium hydroxide Suspension 30 milliLiter(s) Oral two times a day PRN Constipation  oxyCODONE    IR 5 milliGRAM(s) Oral every 3 hours PRN Moderate to Severe Pain (4-10)  oxyCODONE    IR 5 milliGRAM(s) Oral once PRN Moderate to Severe Pain (4-10)  oxyCODONE    IR 5 milliGRAM(s) Oral once PRN Moderate to Severe Pain (4-10)  simethicone 80 milliGRAM(s) Chew every 4 hours PRN Gas        Assessment and Plan  POD # 2  s/p  section  Doing well.  Encourage ambulation.  Continue routine post op care.  Discharge planning

## 2020-11-01 NOTE — DISCHARGE NOTE OB - MEDICATION SUMMARY - MEDICATIONS TO TAKE
I will START or STAY ON the medications listed below when I get home from the hospital:    ibuprofen 600 mg oral tablet  -- 1 tab(s) by mouth every 6 hours  -- Indication: For Pain    acetaminophen 325 mg oral tablet  -- 3 tab(s) by mouth   -- Indication: For Pain

## 2020-11-01 NOTE — DISCHARGE NOTE OB - CARE PLAN
Principal Discharge DX:	 delivery delivered  Goal:	normal postoperative course  Assessment and plan of treatment:	Nothing in vagina, no heavy lifting  Secondary Diagnosis:	H/O myomectomy  Secondary Diagnosis:	Encounter for sterilization  Secondary Diagnosis:	Scar tissue

## 2020-11-01 NOTE — DISCHARGE NOTE OB - CARE PROVIDER_API CALL
Kerline Kuhn  OBSTETRICS AND GYNECOLOGY  [address] [place] [address]  Phone: [phone]  Fax: [phone]  Established Patient  Follow Up Time: 2 weeks

## 2020-11-01 NOTE — DISCHARGE NOTE OB - PATIENT PORTAL LINK FT
You can access the FollowMyHealth Patient Portal offered by [facility] by registering at the following website: [email or web address]. By joining [email or web address]’s FollowMyHealth portal, you will also be able to view your health information using other applications (apps) compatible with our system.

## 2022-03-17 ENCOUNTER — TRANSCRIPTION ENCOUNTER (OUTPATIENT)
Age: 47
End: 2022-03-17

## 2023-02-08 NOTE — OB RN PATIENT PROFILE - EDUCATION OF PARENTS ON THE BENEFITS OF SKIN TO SKIN AND BREASTFEEDING TO BOTH MOTHER AND INFANT.
Joao Calderon MD VISIT & TX  Chemo as planned   RTc c2  MD VISIT 2/22/23 @ 10:30AM TX @ 10AM  SCRIPTS SENT TO PT PHARMACY  AVS PRINTED W/ INSTRUCTIONS AND GIVEN TO PT ON EXIT
Statement Selected

## 2023-07-19 PROBLEM — R04.0 EPISTAXIS: Chronic | Status: ACTIVE | Noted: 2020-10-28

## 2023-07-19 PROBLEM — O98.819: Chronic | Status: ACTIVE | Noted: 2020-10-28

## 2023-07-19 PROBLEM — D64.9 ANEMIA, UNSPECIFIED: Chronic | Status: ACTIVE | Noted: 2020-10-28

## 2023-07-25 PROBLEM — Z00.00 ENCOUNTER FOR PREVENTIVE HEALTH EXAMINATION: Status: ACTIVE | Noted: 2023-07-25

## 2023-08-03 ENCOUNTER — TRANSCRIPTION ENCOUNTER (OUTPATIENT)
Age: 48
End: 2023-08-03

## 2023-08-03 ENCOUNTER — APPOINTMENT (OUTPATIENT)
Dept: SURGERY | Facility: CLINIC | Age: 48
End: 2023-08-03
Payer: COMMERCIAL

## 2023-08-03 VITALS
HEIGHT: 70 IN | WEIGHT: 182 LBS | DIASTOLIC BLOOD PRESSURE: 88 MMHG | BODY MASS INDEX: 26.05 KG/M2 | SYSTOLIC BLOOD PRESSURE: 147 MMHG

## 2023-08-03 DIAGNOSIS — Z78.9 OTHER SPECIFIED HEALTH STATUS: ICD-10-CM

## 2023-08-03 PROCEDURE — 99214 OFFICE O/P EST MOD 30 MIN: CPT

## 2023-08-06 PROBLEM — Z78.9 NON-SMOKER: Status: ACTIVE | Noted: 2023-08-06

## 2023-08-06 PROBLEM — Z78.9 DENIES ALCOHOL CONSUMPTION: Status: ACTIVE | Noted: 2023-08-06

## 2023-08-06 NOTE — CONSULT LETTER
[Dear  ___] : Dear  [unfilled], [Consult Letter:] : I had the pleasure of evaluating your patient, [unfilled]. [Please see my note below.] : Please see my note below. [Consult Closing:] : Thank you very much for allowing me to participate in the care of this patient.  If you have any questions, please do not hesitate to contact me. [Sincerely,] : Sincerely, [FreeTextEntry2] : Dr. Myrna Shah [DrJosep  ___] : Dr. STEPHENS

## 2023-08-06 NOTE — HISTORY OF PRESENT ILLNESS
[de-identified] : Patient referred by Dr Shah for evaluation of enlarging MNG and PHPT.  Patient reports over a 3 year hx of enlarging MNG with prior benign biopsies 11/2021.  Patient denies dysphagia, hoarseness or radiation exposure. Patient initially noted ot have elevated calcium 2016. Blood work 7/2023 Ca 11.2, PTH 75.2, vitamin D 23, TSH 0.4, free T4 0.9.  Thyroid US 12/2022 right lobe 6.6 x 2.5 x x2.7 cm with mid 2.2 cm nodule.  left lobe 7.5 x 3.7 x 2.7 cm with multiple nodules largest lower pole 5.3 x 2.9 x 5.1 cm. Bone density 7/2023 normal. Patient denies kidney stones, HTN, GERD, or increased thirst.   Patient reports worsening memory.  I have reviewed all old and new data and available images.

## 2023-08-06 NOTE — PHYSICAL EXAM
[de-identified] : no cervical or supraclavicular adenopathy, trachea midline, thyroid without enlargement or palpable mass [Normal] : cardiovascular peripheral examination is normal [de-identified] : Skin:  normal appearance.  no rash, nodules, vesicles, or erythema, Musculoskeletal:  full range of motion and no deformities appreciated Neurological:  grossly intact Psychiatric:  oriented to person, place and time with appropriate affect

## 2023-08-06 NOTE — ASSESSMENT
[FreeTextEntry1] : Patient with enlarging MNG and PHPT.   I have discussed possible observation versus surgical intervention with total thyroidectomy at time of parathyroidectomy.  Prior to surgery, I have recommended a 24 hr urine calcium and 4D CT scan to assist in preoperative localization.  I have reviewed the pathophysiology of the disease process, the area anatomy and the rationale for surgery.  I discussed the risks, benefits and alternative treatments which include but are not limited to bleeding, infection, numbness, hoarseness, hypocalcemia, scarring, and need for reoperation.  I have answered the patient's questions to their satisfaction.  The patient will contact my office to review results and schedule surgery.  If no surgery performed, RTO 4 mo.

## 2023-08-06 NOTE — REASON FOR VISIT
[Initial Consultation] : an initial consultation for [FreeTextEntry2] : enlarging MNG and primary HPT [Other: _____] : [unfilled]

## 2023-09-14 ENCOUNTER — NON-APPOINTMENT (OUTPATIENT)
Age: 48
End: 2023-09-14

## 2023-09-21 ENCOUNTER — APPOINTMENT (OUTPATIENT)
Dept: CT IMAGING | Facility: CLINIC | Age: 48
End: 2023-09-21

## 2023-10-22 ENCOUNTER — NON-APPOINTMENT (OUTPATIENT)
Age: 48
End: 2023-10-22

## 2023-10-26 ENCOUNTER — OUTPATIENT (OUTPATIENT)
Dept: OUTPATIENT SERVICES | Facility: HOSPITAL | Age: 48
LOS: 1 days | End: 2023-10-26
Payer: COMMERCIAL

## 2023-10-26 ENCOUNTER — APPOINTMENT (OUTPATIENT)
Dept: CT IMAGING | Facility: IMAGING CENTER | Age: 48
End: 2023-10-26
Payer: COMMERCIAL

## 2023-10-26 DIAGNOSIS — Z98.890 OTHER SPECIFIED POSTPROCEDURAL STATES: Chronic | ICD-10-CM

## 2023-10-26 DIAGNOSIS — E21.0 PRIMARY HYPERPARATHYROIDISM: ICD-10-CM

## 2023-10-26 PROCEDURE — 70492 CT SFT TSUE NCK W/O & W/DYE: CPT | Mod: 26

## 2023-10-26 PROCEDURE — 70492 CT SFT TSUE NCK W/O & W/DYE: CPT

## 2023-11-14 ENCOUNTER — NON-APPOINTMENT (OUTPATIENT)
Age: 48
End: 2023-11-14

## 2023-11-22 ENCOUNTER — OUTPATIENT (OUTPATIENT)
Dept: OUTPATIENT SERVICES | Facility: HOSPITAL | Age: 48
LOS: 1 days | End: 2023-11-22
Payer: COMMERCIAL

## 2023-11-22 VITALS
RESPIRATION RATE: 18 BRPM | DIASTOLIC BLOOD PRESSURE: 82 MMHG | OXYGEN SATURATION: 99 % | HEART RATE: 71 BPM | SYSTOLIC BLOOD PRESSURE: 125 MMHG | TEMPERATURE: 98 F | WEIGHT: 179.9 LBS | HEIGHT: 70 IN

## 2023-11-22 DIAGNOSIS — Z98.51 TUBAL LIGATION STATUS: Chronic | ICD-10-CM

## 2023-11-22 DIAGNOSIS — Z98.890 OTHER SPECIFIED POSTPROCEDURAL STATES: Chronic | ICD-10-CM

## 2023-11-22 DIAGNOSIS — Z98.891 HISTORY OF UTERINE SCAR FROM PREVIOUS SURGERY: Chronic | ICD-10-CM

## 2023-11-22 DIAGNOSIS — E04.2 NONTOXIC MULTINODULAR GOITER: ICD-10-CM

## 2023-11-22 DIAGNOSIS — E21.0 PRIMARY HYPERPARATHYROIDISM: ICD-10-CM

## 2023-11-22 DIAGNOSIS — Z01.818 ENCOUNTER FOR OTHER PREPROCEDURAL EXAMINATION: ICD-10-CM

## 2023-11-22 LAB
ANION GAP SERPL CALC-SCNC: 8 MMOL/L — SIGNIFICANT CHANGE UP (ref 5–17)
ANION GAP SERPL CALC-SCNC: 8 MMOL/L — SIGNIFICANT CHANGE UP (ref 5–17)
BUN SERPL-MCNC: 18 MG/DL — SIGNIFICANT CHANGE UP (ref 7–23)
BUN SERPL-MCNC: 18 MG/DL — SIGNIFICANT CHANGE UP (ref 7–23)
CALCIUM SERPL-MCNC: 10.9 MG/DL — HIGH (ref 8.4–10.5)
CALCIUM SERPL-MCNC: 10.9 MG/DL — HIGH (ref 8.4–10.5)
CHLORIDE SERPL-SCNC: 103 MMOL/L — SIGNIFICANT CHANGE UP (ref 96–108)
CHLORIDE SERPL-SCNC: 103 MMOL/L — SIGNIFICANT CHANGE UP (ref 96–108)
CO2 SERPL-SCNC: 25 MMOL/L — SIGNIFICANT CHANGE UP (ref 22–31)
CO2 SERPL-SCNC: 25 MMOL/L — SIGNIFICANT CHANGE UP (ref 22–31)
CREAT SERPL-MCNC: 0.88 MG/DL — SIGNIFICANT CHANGE UP (ref 0.5–1.3)
CREAT SERPL-MCNC: 0.88 MG/DL — SIGNIFICANT CHANGE UP (ref 0.5–1.3)
EGFR: 82 ML/MIN/1.73M2 — SIGNIFICANT CHANGE UP
EGFR: 82 ML/MIN/1.73M2 — SIGNIFICANT CHANGE UP
GLUCOSE SERPL-MCNC: 97 MG/DL — SIGNIFICANT CHANGE UP (ref 70–99)
GLUCOSE SERPL-MCNC: 97 MG/DL — SIGNIFICANT CHANGE UP (ref 70–99)
HCT VFR BLD CALC: 38.6 % — SIGNIFICANT CHANGE UP (ref 34.5–45)
HCT VFR BLD CALC: 38.6 % — SIGNIFICANT CHANGE UP (ref 34.5–45)
HGB BLD-MCNC: 12.2 G/DL — SIGNIFICANT CHANGE UP (ref 11.5–15.5)
HGB BLD-MCNC: 12.2 G/DL — SIGNIFICANT CHANGE UP (ref 11.5–15.5)
MCHC RBC-ENTMCNC: 26.8 PG — LOW (ref 27–34)
MCHC RBC-ENTMCNC: 26.8 PG — LOW (ref 27–34)
MCHC RBC-ENTMCNC: 31.6 GM/DL — LOW (ref 32–36)
MCHC RBC-ENTMCNC: 31.6 GM/DL — LOW (ref 32–36)
MCV RBC AUTO: 84.8 FL — SIGNIFICANT CHANGE UP (ref 80–100)
MCV RBC AUTO: 84.8 FL — SIGNIFICANT CHANGE UP (ref 80–100)
NRBC # BLD: 0 /100 WBCS — SIGNIFICANT CHANGE UP (ref 0–0)
NRBC # BLD: 0 /100 WBCS — SIGNIFICANT CHANGE UP (ref 0–0)
PLATELET # BLD AUTO: 230 K/UL — SIGNIFICANT CHANGE UP (ref 150–400)
PLATELET # BLD AUTO: 230 K/UL — SIGNIFICANT CHANGE UP (ref 150–400)
POTASSIUM SERPL-MCNC: 4 MMOL/L — SIGNIFICANT CHANGE UP (ref 3.5–5.3)
POTASSIUM SERPL-MCNC: 4 MMOL/L — SIGNIFICANT CHANGE UP (ref 3.5–5.3)
POTASSIUM SERPL-SCNC: 4 MMOL/L — SIGNIFICANT CHANGE UP (ref 3.5–5.3)
POTASSIUM SERPL-SCNC: 4 MMOL/L — SIGNIFICANT CHANGE UP (ref 3.5–5.3)
RBC # BLD: 4.55 M/UL — SIGNIFICANT CHANGE UP (ref 3.8–5.2)
RBC # BLD: 4.55 M/UL — SIGNIFICANT CHANGE UP (ref 3.8–5.2)
RBC # FLD: 15 % — HIGH (ref 10.3–14.5)
RBC # FLD: 15 % — HIGH (ref 10.3–14.5)
SODIUM SERPL-SCNC: 136 MMOL/L — SIGNIFICANT CHANGE UP (ref 135–145)
SODIUM SERPL-SCNC: 136 MMOL/L — SIGNIFICANT CHANGE UP (ref 135–145)
WBC # BLD: 7.06 K/UL — SIGNIFICANT CHANGE UP (ref 3.8–10.5)
WBC # BLD: 7.06 K/UL — SIGNIFICANT CHANGE UP (ref 3.8–10.5)
WBC # FLD AUTO: 7.06 K/UL — SIGNIFICANT CHANGE UP (ref 3.8–10.5)
WBC # FLD AUTO: 7.06 K/UL — SIGNIFICANT CHANGE UP (ref 3.8–10.5)

## 2023-11-22 PROCEDURE — G0463: CPT

## 2023-11-22 PROCEDURE — 85027 COMPLETE CBC AUTOMATED: CPT

## 2023-11-22 PROCEDURE — 80048 BASIC METABOLIC PNL TOTAL CA: CPT

## 2023-11-22 PROCEDURE — 36415 COLL VENOUS BLD VENIPUNCTURE: CPT

## 2023-11-22 NOTE — H&P PST ADULT - NSICDXPASTSURGICALHX_GEN_ALL_CORE_FT
PAST SURGICAL HISTORY:  H/O left inguinal hernia repair     H/O myomectomy     H/O tubal ligation     History of

## 2023-11-22 NOTE — H&P PST ADULT - HISTORY OF PRESENT ILLNESS
47 y/o female with no significant PMH presents for PST.  C/O elevated calcium levels with additional evaluation and dx of hyperparathyroidism and is recommended for surgical intervention.  Also c/o enlarged thyroid glad that continues to increase size over time.  Biopsies that have but done in the past were negitive for malignancy but due to increase in size of nodules recommended for surgery.  Other wise feeling well at [facility].  Patient does report acute viral illness approx. 3 weeks ago that was evaluated at urgent car, neg for flu and covid per patient, and symptoms are almost completely resolved- mild residual cough.  Denies any fever, SOB, nasal congestions or chest pain.  Scheduled parathyroidectomy with PTH assay, total thyroidectomy with closure and frozen section with Dr Ragland on 12/04/2023   47 y/o female with no significant PMH presents for PST.  C/O elevated calcium levels with additional evaluation and dx of hyperparathyroidism and is recommended for surgical intervention.  Also c/o enlarged thyroid glad that continues to increase size over time.  Biopsies that have but done in the past were negitive for malignancy but due to increase in size of nodules recommended for surgery.  Other wise feeling well at [facility].  Patient does report acute viral illness approx. 3 weeks ago that was evaluated at urgent car, neg for flu and covid per patient, and symptoms are almost completely resolved- mild residual cough.  Denies any fever, SOB, nasal congestions or chest pain.  Scheduled parathyroidectomy with PTH assay, total thyroidectomy with closure and frozen section with Dr Ragland on 12/04/2023   47 y/o female with no significant PMH presents for PST.  C/O elevated calcium levels with additional evaluation and dx of hyperparathyroidism and is recommended for surgical intervention.  Also c/o enlarged thyroid glad that continues to increase size over time.  Biopsies that have but done in the past were negitive for malignancy but due to increase in size of nodules recommended for surgery.  Other wise feeling well at [facility].  Patient does report acute viral illness approx. 3 weeks ago that was evaluated at urgent car, neg for flu and covid per patient, and symptoms are almost completely resolved- mild residual cough.  Denies any fever, SOB, nasal congestions or chest pain.  Scheduled parathyroidectomy with PTH assay, total thyroidectomy with closure and frozen section with Dr Ragland on 12/04/2023   49 y/o female with no significant PMH presents for PST.  C/O elevated calcium levels with additional evaluation and dx of hyperparathyroidism and is recommended for surgical intervention.  Also c/o enlarged thyroid gland that continues to increase size over time.  Biopsies that have but done in the past were negative for malignancy but due to increase in size of nodules recommended for surgery.  Other wise feeling well at PST.  Patient does report acute viral illness approx. 3 weeks ago that was evaluated at urgent car, neg for flu and covid per patient, and symptoms are almost completely resolved- mild residual cough.  Denies any fever, SOB, nasal congestions or chest pain.  Scheduled parathyroidectomy with PTH assay, total thyroidectomy with closure and frozen section with Dr Ragland on 12/04/2023   47 y/o female with no significant PMH presents for PST.  C/O elevated calcium levels with additional evaluation and dx of hyperparathyroidism and is recommended for surgical intervention.  Also c/o enlarged thyroid gland that continues to increase size over time.  Biopsies that have but done in the past were negative for malignancy but due to increase in size of nodules recommended for surgery.  Other wise feeling well at PST.  Patient does report acute viral illness approx. 3 weeks ago that was evaluated at urgent car, neg for flu and covid per patient, and symptoms are almost completely resolved- mild residual cough.  Denies any fever, SOB, nasal congestions or chest pain.  Scheduled parathyroidectomy with PTH assay, total thyroidectomy with closure and frozen section with Dr Ragland on 12/04/2023

## 2023-11-22 NOTE — H&P PST ADULT - PROBLEM SELECTOR PLAN 1
Scheduled parathyroidectomy with PTH assay, total thyroidectomy with closure and frozen section with Dr Ragland on 12/04/2023.  Pre op instructions reviewed and patient verbalized understanding.  CBC, BMP, pending.  NPO after midnight night before procedure.  To stop all ASA, NSAIDs, vitamins and supplements 1 week prior to procedure.  Chlorhexidene wash given with instructions.  Patient advised that if cold symptoms reoccur or if new acute illness develops to notify surgeon- agreeable.  UCG pre op

## 2023-11-22 NOTE — H&P PST ADULT - NSICDXFAMILYHX_GEN_ALL_CORE_FT
FAMILY HISTORY:  Mother  Still living? Yes, Estimated age: Age Unknown  Hypertension, Age at diagnosis: Age Unknown    Grandparent  Still living? No  Hypertension, Age at diagnosis: Age Unknown

## 2023-11-22 NOTE — H&P PST ADULT - RESPIRATORY AND THORAX COMMENTS
due to post nasal gtt but has greatly improved mild residual cough due to post nasal gtt but has greatly improved and not noted at PST

## 2023-11-22 NOTE — H&P PST ADULT - NEGATIVE ENMT SYMPTOMS
no hearing difficulty/no ear pain/no sinus symptoms/no nasal congestion/no nasal discharge/no nasal obstruction/no throat pain

## 2023-11-22 NOTE — H&P PST ADULT - NSICDXPASTMEDICALHX_GEN_ALL_CORE_FT
PAST MEDICAL HISTORY:  Anemia     Epistaxis     Group B streptococcal infection during pregnancy 10/27/2020    Nontoxic multinodular goiter     Primary hyperparathyroidism

## 2023-11-22 NOTE — H&P PST ADULT - NSANTHOSAYNRD_GEN_A_CORE
neck 15 inches/No. JOSE screening performed.  STOP BANG Legend: 0-2 = LOW Risk; 3-4 = INTERMEDIATE Risk; 5-8 = HIGH Risk

## 2023-11-22 NOTE — H&P PST ADULT - GENERAL COMMENTS
recovering from acute illness that started approx 3 weeks ago, went to urgent care at that time and was covid and flu negative

## 2023-12-04 ENCOUNTER — TRANSCRIPTION ENCOUNTER (OUTPATIENT)
Age: 48
End: 2023-12-04

## 2023-12-04 ENCOUNTER — RESULT REVIEW (OUTPATIENT)
Age: 48
End: 2023-12-04

## 2023-12-04 ENCOUNTER — OUTPATIENT (OUTPATIENT)
Dept: OUTPATIENT SERVICES | Facility: HOSPITAL | Age: 48
LOS: 1 days | End: 2023-12-04
Payer: COMMERCIAL

## 2023-12-04 ENCOUNTER — APPOINTMENT (OUTPATIENT)
Dept: SURGERY | Facility: HOSPITAL | Age: 48
End: 2023-12-04
Payer: COMMERCIAL

## 2023-12-04 VITALS
SYSTOLIC BLOOD PRESSURE: 130 MMHG | WEIGHT: 179.9 LBS | OXYGEN SATURATION: 99 % | TEMPERATURE: 98 F | HEIGHT: 70 IN | RESPIRATION RATE: 14 BRPM | HEART RATE: 66 BPM | DIASTOLIC BLOOD PRESSURE: 86 MMHG

## 2023-12-04 VITALS
OXYGEN SATURATION: 100 % | TEMPERATURE: 98 F | HEART RATE: 71 BPM | RESPIRATION RATE: 16 BRPM | DIASTOLIC BLOOD PRESSURE: 86 MMHG | SYSTOLIC BLOOD PRESSURE: 125 MMHG

## 2023-12-04 DIAGNOSIS — E04.2 NONTOXIC MULTINODULAR GOITER: ICD-10-CM

## 2023-12-04 DIAGNOSIS — Z98.890 OTHER SPECIFIED POSTPROCEDURAL STATES: Chronic | ICD-10-CM

## 2023-12-04 DIAGNOSIS — Z98.891 HISTORY OF UTERINE SCAR FROM PREVIOUS SURGERY: Chronic | ICD-10-CM

## 2023-12-04 DIAGNOSIS — Z98.51 TUBAL LIGATION STATUS: Chronic | ICD-10-CM

## 2023-12-04 DIAGNOSIS — E21.0 PRIMARY HYPERPARATHYROIDISM: ICD-10-CM

## 2023-12-04 LAB
PTH INTACT, INTRAOP TIMING 2: SIGNIFICANT CHANGE UP
PTH INTACT, INTRAOP TIMING 2: SIGNIFICANT CHANGE UP
PTH INTACT, INTRAOP TIMING 3: SIGNIFICANT CHANGE UP
PTH INTACT, INTRAOP TIMING 3: SIGNIFICANT CHANGE UP
PTH INTACT, INTRAOP TIMING 4: SIGNIFICANT CHANGE UP
PTH INTACT, INTRAOP TIMING 4: SIGNIFICANT CHANGE UP
PTH INTACT, INTRAOP TIMING 5: SIGNIFICANT CHANGE UP
PTH INTACT, INTRAOP TIMING 5: SIGNIFICANT CHANGE UP
PTH INTACT, INTRAOP TIMING 6: SIGNIFICANT CHANGE UP
PTH INTACT, INTRAOP TIMING 6: SIGNIFICANT CHANGE UP
PTH INTACT, INTRAOP TIMING 7: SIGNIFICANT CHANGE UP
PTH INTACT, INTRAOP TIMING 7: SIGNIFICANT CHANGE UP
PTH INTACT, INTRAOP TIMING 8: SIGNIFICANT CHANGE UP
PTH INTACT, INTRAOP TIMING 8: SIGNIFICANT CHANGE UP
PTH INTACT, INTRAOPERATIVE 2: 282 PG/ML — HIGH (ref 15–65)
PTH INTACT, INTRAOPERATIVE 2: 282 PG/ML — HIGH (ref 15–65)
PTH INTACT, INTRAOPERATIVE 3: 160 PG/ML — HIGH (ref 15–65)
PTH INTACT, INTRAOPERATIVE 3: 160 PG/ML — HIGH (ref 15–65)
PTH INTACT, INTRAOPERATIVE 4: 113 PG/ML — HIGH (ref 15–65)
PTH INTACT, INTRAOPERATIVE 4: 113 PG/ML — HIGH (ref 15–65)
PTH INTACT, INTRAOPERATIVE 5: 90 PG/ML — HIGH (ref 15–65)
PTH INTACT, INTRAOPERATIVE 5: 90 PG/ML — HIGH (ref 15–65)
PTH INTACT, INTRAOPERATIVE 6: 52 PG/ML — SIGNIFICANT CHANGE UP (ref 15–65)
PTH INTACT, INTRAOPERATIVE 6: 52 PG/ML — SIGNIFICANT CHANGE UP (ref 15–65)
PTH INTACT, INTRAOPERATIVE 7: 41 PG/ML — SIGNIFICANT CHANGE UP (ref 15–65)
PTH INTACT, INTRAOPERATIVE 7: 41 PG/ML — SIGNIFICANT CHANGE UP (ref 15–65)
PTH INTACT, INTRAOPERATIVE 8: 25 PG/ML — SIGNIFICANT CHANGE UP (ref 15–65)
PTH INTACT, INTRAOPERATIVE 8: 25 PG/ML — SIGNIFICANT CHANGE UP (ref 15–65)
PTH-INTACT IO % DIF SERPL: 86 PG/ML — HIGH (ref 15–65)
PTH-INTACT IO % DIF SERPL: 86 PG/ML — HIGH (ref 15–65)

## 2023-12-04 PROCEDURE — 88342 IMHCHEM/IMCYTCHM 1ST ANTB: CPT

## 2023-12-04 PROCEDURE — 88331 PATH CONSLTJ SURG 1 BLK 1SPC: CPT

## 2023-12-04 PROCEDURE — 60271 REMOVAL OF THYROID: CPT

## 2023-12-04 PROCEDURE — 60500 EXPLORE PARATHYROID GLANDS: CPT | Mod: AS,59

## 2023-12-04 PROCEDURE — 88307 TISSUE EXAM BY PATHOLOGIST: CPT

## 2023-12-04 PROCEDURE — 88331 PATH CONSLTJ SURG 1 BLK 1SPC: CPT | Mod: 26

## 2023-12-04 PROCEDURE — 60500 EXPLORE PARATHYROID GLANDS: CPT

## 2023-12-04 PROCEDURE — 88342 IMHCHEM/IMCYTCHM 1ST ANTB: CPT | Mod: 26

## 2023-12-04 PROCEDURE — 88332 PATH CONSLTJ SURG EA ADD BLK: CPT | Mod: 26

## 2023-12-04 PROCEDURE — 13132 CMPLX RPR F/C/C/M/N/AX/G/H/F: CPT | Mod: AS,59

## 2023-12-04 PROCEDURE — 88305 TISSUE EXAM BY PATHOLOGIST: CPT | Mod: 26

## 2023-12-04 PROCEDURE — 60500 EXPLORE PARATHYROID GLANDS: CPT | Mod: 59

## 2023-12-04 PROCEDURE — 13132 CMPLX RPR F/C/C/M/N/AX/G/H/F: CPT | Mod: 59

## 2023-12-04 PROCEDURE — 88307 TISSUE EXAM BY PATHOLOGIST: CPT | Mod: 26

## 2023-12-04 PROCEDURE — 36415 COLL VENOUS BLD VENIPUNCTURE: CPT

## 2023-12-04 PROCEDURE — 88332 PATH CONSLTJ SURG EA ADD BLK: CPT

## 2023-12-04 PROCEDURE — 83970 ASSAY OF PARATHORMONE: CPT

## 2023-12-04 PROCEDURE — 60240 REMOVAL OF THYROID: CPT | Mod: AS

## 2023-12-04 PROCEDURE — C9399: CPT

## 2023-12-04 PROCEDURE — 88341 IMHCHEM/IMCYTCHM EA ADD ANTB: CPT | Mod: 26

## 2023-12-04 PROCEDURE — 88305 TISSUE EXAM BY PATHOLOGIST: CPT

## 2023-12-04 PROCEDURE — 88341 IMHCHEM/IMCYTCHM EA ADD ANTB: CPT

## 2023-12-04 PROCEDURE — 60271 REMOVAL OF THYROID: CPT | Mod: AS

## 2023-12-04 DEVICE — LIGATING CLIPS WECK HORIZON SMALL (YELLOW) 24: Type: IMPLANTABLE DEVICE | Status: FUNCTIONAL

## 2023-12-04 DEVICE — LIGATING CLIPS WECK HORIZON MEDIUM (BLUE) 24: Type: IMPLANTABLE DEVICE | Status: FUNCTIONAL

## 2023-12-04 RX ORDER — CETIRIZINE HYDROCHLORIDE 10 MG/1
1 TABLET ORAL
Refills: 0 | DISCHARGE

## 2023-12-04 RX ORDER — DEXTROMETHORPHAN POLISTIREX 30 MG/5 ML
8 SUSPENSION, EXTENDED RELEASE 12 HR ORAL
Refills: 0 | DISCHARGE

## 2023-12-04 RX ORDER — BENZOCAINE AND MENTHOL 5; 1 G/100ML; G/100ML
1 LIQUID ORAL
Refills: 0 | Status: DISCONTINUED | OUTPATIENT
Start: 2023-12-04 | End: 2023-12-18

## 2023-12-04 RX ORDER — SODIUM CHLORIDE 9 MG/ML
1000 INJECTION, SOLUTION INTRAVENOUS
Refills: 0 | Status: DISCONTINUED | OUTPATIENT
Start: 2023-12-04 | End: 2023-12-04

## 2023-12-04 RX ORDER — OXYCODONE HYDROCHLORIDE 5 MG/1
5 TABLET ORAL ONCE
Refills: 0 | Status: DISCONTINUED | OUTPATIENT
Start: 2023-12-04 | End: 2023-12-04

## 2023-12-04 RX ORDER — ACETAMINOPHEN 500 MG
2 TABLET ORAL
Qty: 0 | Refills: 0 | DISCHARGE

## 2023-12-04 RX ORDER — CHOLECALCIFEROL (VITAMIN D3) 125 MCG
0 CAPSULE ORAL
Refills: 0 | DISCHARGE

## 2023-12-04 RX ORDER — HYDROMORPHONE HYDROCHLORIDE 2 MG/ML
0.5 INJECTION INTRAMUSCULAR; INTRAVENOUS; SUBCUTANEOUS
Refills: 0 | Status: DISCONTINUED | OUTPATIENT
Start: 2023-12-04 | End: 2023-12-04

## 2023-12-04 RX ORDER — ONDANSETRON 8 MG/1
4 TABLET, FILM COATED ORAL ONCE
Refills: 0 | Status: COMPLETED | OUTPATIENT
Start: 2023-12-04 | End: 2023-12-04

## 2023-12-04 RX ORDER — PHENYLEPHRINE/DM/ACETAMINOP/GG 5-10-325MG
2 TABLET ORAL
Refills: 0 | DISCHARGE

## 2023-12-04 RX ADMIN — ONDANSETRON 4 MILLIGRAM(S): 8 TABLET, FILM COATED ORAL at 14:01

## 2023-12-04 RX ADMIN — Medication 1 TABLET(S): at 14:20

## 2023-12-04 NOTE — ASU DISCHARGE PLAN (ADULT/PEDIATRIC) - NURSING INSTRUCTIONS
All discharge information reviewed with patient including pain, safety, medication and follow up care . Pt acknowledges understanding of discharge instructions. Pt educated on taking calcium

## 2023-12-04 NOTE — BRIEF OPERATIVE NOTE - NSICDXBRIEFPREOP_GEN_ALL_CORE_FT
PRE-OP DIAGNOSIS:  Multinodular goiter 04-Dec-2023 13:34:41  Elena Rojas  Hyperparathyroidism 04-Dec-2023 13:35:05  Elena Rojas

## 2023-12-04 NOTE — ASU DISCHARGE PLAN (ADULT/PEDIATRIC) - CARE PROVIDER_API CALL
Marilyn Ragland Lorie  Surgery  [address] [place] [address], [facility] [address]  [place] [address]  Phone: [phone]  Fax: [phone]  Follow Up Time:    Marilyn Ragland Lorie  Surgery  [address] [place] [address], [facility] [address]  [place] [address]  Phone: [phone]  Fax: [phone]  Follow Up Time:

## 2023-12-04 NOTE — ASU DISCHARGE PLAN (ADULT/PEDIATRIC) - ASU DC SPECIAL INSTRUCTIONSFT
Keep head of bed elevated and apply ice frequently to neck incision to alleviate discomfort and swelling.  Wear supportive bra x 2 weeks at all times (except when showering) to decrease strain on neck/chest muscles and to promote healthy scar formation.  No NSAIDS (Ibuprofen, Motrin, Advil, Alleve or aspirin) unless approved by Dr. Ragland.  Follow-up Dr. Ragland in office tomorrow 12/05/23 @ 2:30 p.m. for drain removal.  Tylenol (500mg) 2 tabs by mouth every 8 hours as needed for discomfort.  Oscal with Vit D (500mg/200 int units) 1 tab by mouth three times daily

## 2023-12-04 NOTE — ASU DISCHARGE PLAN (ADULT/PEDIATRIC) - NS MD DC FALL RISK RISK
For information on Fall & Injury Prevention, visit: [email or web address] OR  [email or web address] OR  https://www.cdc.gov/steadi/patient.html For information on Fall & Injury Prevention, visit: [email or web address] OR  [email or web address] OR  https://www.cdc.gov/steadi/patient.html

## 2023-12-04 NOTE — ASU PATIENT PROFILE, ADULT - FALL HARM RISK - HARM RISK INTERVENTIONS
Communicate Risk of Fall with Harm to all staff/Reinforce activity limits and safety measures with patient and family/Tailored Fall Risk Interventions/Visual Cue: Yellow wristband and red socks/Bed in lowest position, wheels locked, appropriate side rails in place/Call bell, personal items and telephone in reach/Instruct patient to call for assistance before getting out of bed or chair/Non-slip footwear when patient is out of bed/Kenneth to call system/Physically safe environment - no spills, clutter or unnecessary equipment/Purposeful Proactive Rounding/Room/bathroom lighting operational, light cord in reach Communicate Risk of Fall with Harm to all staff/Reinforce activity limits and safety measures with patient and family/Tailored Fall Risk Interventions/Visual Cue: Yellow wristband and red socks/Bed in lowest position, wheels locked, appropriate side rails in place/Call bell, personal items and telephone in reach/Instruct patient to call for assistance before getting out of bed or chair/Non-slip footwear when patient is out of bed/Ama to call system/Physically safe environment - no spills, clutter or unnecessary equipment/Purposeful Proactive Rounding/Room/bathroom lighting operational, light cord in reach

## 2023-12-04 NOTE — BRIEF OPERATIVE NOTE - NSICDXBRIEFPOSTOP_GEN_ALL_CORE_FT
POST-OP DIAGNOSIS:  Multinodular goiter 04-Dec-2023 13:35:24  Elena Rojas  Hyperparathyroidism 04-Dec-2023 13:35:48  Elena Rojas

## 2023-12-04 NOTE — ASU PATIENT PROFILE, ADULT - NS TRANSFER PATIENT BELONGINGS
wallet- taking, pocketbook, upper and lower plastic retainers to be removed/Cell Phone/PDA (specify)/Jewelry/Money (specify)/Clothing

## 2023-12-04 NOTE — ASU PREOP CHECKLIST - BMI (KG/M2)
Problem: Discharge Planning:  Goal: Knowledge of discharge instructions  Knowledge of discharge instructions   Outcome: Ongoing      Problem: Infection - Surgical Site:  Goal: Signs of wound healing will improve  Signs of wound healing will improve   Outcome: Ongoing      Problem: Mobility - Impaired:  Goal: Achieve maximum mobility level  Achieve maximum mobility level   Outcome: Ongoing      Problem: Pain - Acute:  Goal: Pain level will decrease  Pain level will decrease   Outcome: Ongoing      Problem: Falls - Risk of  Goal: Absence of falls  Outcome: Ongoing      Problem: Risk for Impaired Skin Integrity  Goal: Tissue integrity - skin and mucous membranes  Structural intactness and normal physiological function of skin and  mucous membranes.    Outcome: Ongoing      Problem: Musculor/Skeletal Functional Status  Goal: Highest potential functional level  Outcome: Ongoing 25.8

## 2023-12-04 NOTE — BRIEF OPERATIVE NOTE - NSICDXBRIEFPROCEDURE_GEN_ALL_CORE_FT
PROCEDURES:  Total thyroidectomy 04-Dec-2023 13:34:05  Elena Rojas  Parathyroidectomy, with intraoperative PTH measurement 04-Dec-2023 13:34:24  Elena Rojas

## 2023-12-05 ENCOUNTER — APPOINTMENT (OUTPATIENT)
Dept: SURGERY | Facility: CLINIC | Age: 48
End: 2023-12-05
Payer: COMMERCIAL

## 2023-12-05 PROBLEM — E21.0 PRIMARY HYPERPARATHYROIDISM: Chronic | Status: ACTIVE | Noted: 2023-11-22

## 2023-12-05 PROCEDURE — 36415 COLL VENOUS BLD VENIPUNCTURE: CPT

## 2023-12-05 PROCEDURE — 99024 POSTOP FOLLOW-UP VISIT: CPT

## 2023-12-07 ENCOUNTER — NON-APPOINTMENT (OUTPATIENT)
Age: 48
End: 2023-12-07

## 2023-12-07 PROBLEM — E04.2 NONTOXIC MULTINODULAR GOITER: Chronic | Status: ACTIVE | Noted: 2023-11-22

## 2023-12-07 LAB
25(OH)D3 SERPL-MCNC: 20.9 NG/ML
CALCIUM SERPL-MCNC: 9.5 MG/DL
CALCIUM SERPL-MCNC: 9.5 MG/DL
PARATHYROID HORMONE INTACT: 2 PG/ML

## 2023-12-12 ENCOUNTER — APPOINTMENT (OUTPATIENT)
Dept: SURGERY | Facility: CLINIC | Age: 48
End: 2023-12-12
Payer: COMMERCIAL

## 2023-12-12 PROCEDURE — 99024 POSTOP FOLLOW-UP VISIT: CPT

## 2023-12-12 PROCEDURE — 36415 COLL VENOUS BLD VENIPUNCTURE: CPT

## 2023-12-12 RX ORDER — CALCITRIOL 0.5 UG/1
0.5 CAPSULE, LIQUID FILLED ORAL
Qty: 60 | Refills: 1 | Status: ACTIVE | COMMUNITY
Start: 2023-12-12 | End: 1900-01-01

## 2023-12-13 ENCOUNTER — NON-APPOINTMENT (OUTPATIENT)
Age: 48
End: 2023-12-13

## 2023-12-13 LAB
25(OH)D3 SERPL-MCNC: 26.5 NG/ML
CALCIUM SERPL-MCNC: 9.2 MG/DL
CALCIUM SERPL-MCNC: 9.2 MG/DL
PARATHYROID HORMONE INTACT: 25 PG/ML

## 2023-12-15 LAB
SURGICAL PATHOLOGY STUDY: SIGNIFICANT CHANGE UP
SURGICAL PATHOLOGY STUDY: SIGNIFICANT CHANGE UP

## 2024-01-25 ENCOUNTER — APPOINTMENT (OUTPATIENT)
Dept: SURGERY | Facility: CLINIC | Age: 49
End: 2024-01-25
Payer: COMMERCIAL

## 2024-01-25 PROCEDURE — 36415 COLL VENOUS BLD VENIPUNCTURE: CPT

## 2024-01-25 PROCEDURE — 99024 POSTOP FOLLOW-UP VISIT: CPT

## 2024-01-25 RX ORDER — LEVOTHYROXINE SODIUM 150 UG/1
150 CAPSULE ORAL
Refills: 0 | Status: ACTIVE | COMMUNITY

## 2024-02-01 ENCOUNTER — NON-APPOINTMENT (OUTPATIENT)
Age: 49
End: 2024-02-01

## 2024-02-02 ENCOUNTER — RX RENEWAL (OUTPATIENT)
Age: 49
End: 2024-02-02

## 2024-02-02 ENCOUNTER — NON-APPOINTMENT (OUTPATIENT)
Age: 49
End: 2024-02-02

## 2024-02-02 LAB
25(OH)D3 SERPL-MCNC: 27.2 NG/ML
CALCIUM SERPL-MCNC: 8.5 MG/DL
CALCIUM SERPL-MCNC: 8.5 MG/DL
PARATHYROID HORMONE INTACT: 51 PG/ML
T3 SERPL-MCNC: 86 NG/DL
T4 FREE SERPL-MCNC: 1.6 NG/DL
TSH SERPL-ACNC: 14.1 UIU/ML

## 2024-05-28 ENCOUNTER — APPOINTMENT (OUTPATIENT)
Dept: SURGERY | Facility: CLINIC | Age: 49
End: 2024-05-28

## 2024-06-18 ENCOUNTER — APPOINTMENT (OUTPATIENT)
Dept: SURGERY | Facility: CLINIC | Age: 49
End: 2024-06-18
Payer: COMMERCIAL

## 2024-06-18 DIAGNOSIS — E04.2 NONTOXIC MULTINODULAR GOITER: ICD-10-CM

## 2024-06-18 DIAGNOSIS — E21.0 PRIMARY HYPERPARATHYROIDISM: ICD-10-CM

## 2024-06-18 PROCEDURE — 99213 OFFICE O/P EST LOW 20 MIN: CPT

## 2024-06-18 NOTE — ASSESSMENT
[FreeTextEntry1] : Patient s/p total thyroidectomy with resection substernal goiter and parathyroidectomy.  will review recent serenity, if WNL, will continue under care of Dr Shah and return as needed.  I have answered their questions to the best of my ability.

## 2024-06-18 NOTE — PHYSICAL EXAM
[de-identified] : Incision healing well, scar min discussed. no cervical or supraclavicular adenopathy, trachea midline, no palpable mass [Normal] : cardiovascular peripheral examination is normal [de-identified] : Skin:  normal appearance.  no rash, nodules, vesicles, or erythema, Musculoskeletal:  full range of motion and no deformities appreciated Neurological:  grossly intact Psychiatric:  oriented to person, place and time with appropriate affect

## 2024-06-18 NOTE — HISTORY OF PRESENT ILLNESS
[de-identified] : Patient referred by Dr Shah for evaluation of enlarging MNG and PHPT.  Patient reports over a 3 year hx of enlarging MNG with prior benign biopsies 11/2021.  Patient denies dysphagia, hoarseness or radiation exposure. Patient initially noted ot have elevated calcium 2016. Blood work 7/2023 Ca 11.2, PTH 75.2, vitamin D 23, TSH 0.4, free T4 0.9.  Thyroid US 12/2022 right lobe 6.6 x 2.5 x x2.7 cm with mid 2.2 cm nodule.  left lobe 7.5 x 3.7 x 2.7 cm with multiple nodules largest lower pole 5.3 x 2.9 x 5.1 cm. Bone density 7/2023 normal. Patient denies kidney stones, HTN, GERD, or increased thirst.   Patient reports worsening memory. 12/4/23 total thyroidecotmy with resection substernal goiter and left and right superior parathyroidectomy. path benign thyroid tissue.  Left superior parathyroid hypercellular, right superior intrathyroidal hypercellular parathyroid.  patient continues on 150, serenity 1 month ago with Dr Shah good per patient.  She was feeling extremely fatigued. I have reviewed all old and new data and available images.

## 2024-06-25 ENCOUNTER — NON-APPOINTMENT (OUTPATIENT)
Age: 49
End: 2024-06-25

## 2024-08-21 NOTE — DISCHARGE NOTE OB - MATERIALS PROVIDED
Patient/Caregiver provided printed discharge information.   Immunization Record/Shaken Baby Prevention Handout/[facility]  Screening Program/Breastfeeding Log/[facility] Hearing Screen Program/Birth Certificate Instructions/Guide to Postpartum Care/Tdap Vaccination (VIS Pub Date: 2012)

## 2025-01-10 NOTE — H&P PST ADULT - ENMT
Group Topic:  Activity Group    Date: 1/9/2025  Start Time: 2040  End Time: 2145  Facilitators: Diane Rogers    Focus: Patients were asked their current emotional state and were asked to elaborate on their feelings. Tonight's group topic was on \"Self compassion.\" Patients were given an article which gave them ten ways to help show more compassion to themselves. The ten were as follows: 1. Transform your mindset 2.Speak (and think!) kindly about yourself 3.Forgive yourself for your mistakes 4.Spend time doing things you truly enjoy 5.Strive to avoid judgements and assumptions 6.Find common group with others 7.Take care of your mind and your body 8.Pay attention to where your passion lies 9.Realize it's not all about you 10.Cultivate acceptance (even for your flaws). The objective of this topic was for patients to realize the importance of self compassion and how it can enhance their lives in a variety of ways.   Number in attendance: 8  Pt reported feeling good, but guilty. Pt talked about the relationship with her mother and how it causes her a lot of pain and distress. Pt also spoke on her grandmother and how much she meant to her. Pt became emotional. Pt participated in the activity and in the discussion. Pt appeared anxious.  The following are fill in the blank questions from the worksheet and the pts answers:  1.I can show myself compassion by ________.  Pts answer: \"Trying to speak more kindly to myself and give myself ravinder.\"  2.I need to let go of _______.  Pts answer: \"Past trauma and grudges.\"  3.I often find myself thinking about ________.  Pts answer: \"All my mistakes.\"  4.I would define compassion as _______.  Pts answer: \"Extending kindness.\"    details… neck

## (undated) DEVICE — SUT VICRYL 2-0 18" TIES UNDYED

## (undated) DEVICE — DRAIN JACKSON PRATT 7MM FLAT FULL NO TROCAR

## (undated) DEVICE — VISITEC 4X4

## (undated) DEVICE — POSITIONER FOAM EGG CRATE ULNAR 2PCS (PINK)

## (undated) DEVICE — ELCTR BOVIE PENCIL HANDPIECE

## (undated) DEVICE — SUT ETHILON 3-0 18" FS-1

## (undated) DEVICE — DRSG BENZOIN 0.6CC

## (undated) DEVICE — SYR LUER LOK 10CC

## (undated) DEVICE — SOL IRR POUR H2O 1500ML

## (undated) DEVICE — LABELS BLANK W PEN

## (undated) DEVICE — DRAIN RESERVOIR FOR JACKSON PRATT 100CC CARDINAL

## (undated) DEVICE — NDL HYPO SAFE 25G X 1.5" (ORANGE)

## (undated) DEVICE — SUT SILK 2-0 30" SH

## (undated) DEVICE — SPONGE PEANUT AUTO COUNT

## (undated) DEVICE — SAFETY PIN

## (undated) DEVICE — PACK MINOR

## (undated) DEVICE — WARMING BLANKET LOWER ADULT

## (undated) DEVICE — DRAPE MAGNETIC INSTRUMENT MEDIUM

## (undated) DEVICE — DRAPE 3/4 SHEET 52X76"

## (undated) DEVICE — SUCTION YANKAUER TAPERED BULBOUS NO VENT

## (undated) DEVICE — TUBING 10FT W/WAND

## (undated) DEVICE — GLV 6.5 PROTEXIS W HYDROGEL

## (undated) DEVICE — SYR ASEPTO

## (undated) DEVICE — ELCTR GROUNDING PAD ADULT COVIDIEN

## (undated) DEVICE — SUT POLYSORB 2-0 30" V-20 UNDYED

## (undated) DEVICE — SUT VICRYL 0 18" TIES UNDYED

## (undated) DEVICE — VENODYNE/SCD SLEEVE CALF MEDIUM

## (undated) DEVICE — DRAPE LAPAROTOMY TRANSVERSE

## (undated) DEVICE — SUT MONOCRYL 4-0 27" PS-2 UNDYED

## (undated) DEVICE — DRAPE FLUID WARMER 44 X 44"

## (undated) DEVICE — SUT VICRYL 3-0 18" TIES UNDYED

## (undated) DEVICE — SUT CHROMIC 4-0 30" V-20

## (undated) DEVICE — DRAPE INSTRUMENT POUCH 6.75" X 11"

## (undated) DEVICE — DRSG STERISTRIPS 0.5 X 4"

## (undated) DEVICE — DRAPE TOWEL BLUE 17" X 24"